# Patient Record
Sex: MALE | Race: WHITE | NOT HISPANIC OR LATINO | URBAN - METROPOLITAN AREA
[De-identification: names, ages, dates, MRNs, and addresses within clinical notes are randomized per-mention and may not be internally consistent; named-entity substitution may affect disease eponyms.]

---

## 2024-11-26 ENCOUNTER — APPOINTMENT (OUTPATIENT)
Dept: URBAN - METROPOLITAN AREA CLINIC 246 | Age: 74
Setting detail: DERMATOLOGY
End: 2024-11-26

## 2024-11-26 DIAGNOSIS — L21.8 OTHER SEBORRHEIC DERMATITIS: ICD-10-CM

## 2024-11-26 PROCEDURE — 99203 OFFICE O/P NEW LOW 30 MIN: CPT

## 2024-11-26 PROCEDURE — OTHER COUNSELING: OTHER

## 2024-11-26 PROCEDURE — OTHER PRESCRIPTION: OTHER

## 2024-11-26 PROCEDURE — OTHER MIPS QUALITY: OTHER

## 2024-11-26 PROCEDURE — OTHER TREATMENT REGIMEN: OTHER

## 2024-11-26 RX ORDER — TRIAMCINOLONE ACETONIDE 1 MG/G
OINTMENT TOPICAL BID
Qty: 80 | Refills: 1 | Status: ERX | COMMUNITY
Start: 2024-11-26

## 2024-11-26 ASSESSMENT — LOCATION ZONE DERM
LOCATION ZONE: NECK
LOCATION ZONE: SCALP
LOCATION ZONE: SCALP
LOCATION ZONE: NECK

## 2024-11-26 ASSESSMENT — LOCATION SIMPLE DESCRIPTION DERM
LOCATION SIMPLE: POSTERIOR SCALP
LOCATION SIMPLE: LEFT ANTERIOR NECK
LOCATION SIMPLE: LEFT ANTERIOR NECK
LOCATION SIMPLE: POSTERIOR SCALP

## 2024-11-26 ASSESSMENT — LOCATION DETAILED DESCRIPTION DERM
LOCATION DETAILED: LEFT CLAVICULAR NECK
LOCATION DETAILED: MID-OCCIPITAL SCALP
LOCATION DETAILED: MID-OCCIPITAL SCALP
LOCATION DETAILED: LEFT CLAVICULAR NECK

## 2024-11-26 NOTE — PROCEDURE: TREATMENT REGIMEN
Otc Regimen: Nizoral shampoo 1-2 times per week
Initiate Treatment: triamcinolone acetonide 0.1 % topical ointment BID\\nQuantity: 80.0 g  Days Supply: 30\\nSig: Apply twice daily to the affected areas on scalp and body that are rough, red and itchy for 2 weeks then take a 2 week break and restart as needed for flares.
Detail Level: Zone
Modified Advancement Flap Text: The defect edges were debeveled with a #15 scalpel blade.  Given the location of the defect, shape of the defect and the proximity to free margins a modified advancement flap was deemed most appropriate.  Using a sterile surgical marker, an appropriate advancement flap was drawn incorporating the defect and placing the expected incisions within the relaxed skin tension lines where possible.    The area thus outlined was incised deep to adipose tissue with a #15 scalpel blade.  The skin margins were undermined to an appropriate distance in all directions utilizing iris scissors.